# Patient Record
Sex: MALE | Race: WHITE | NOT HISPANIC OR LATINO | Employment: OTHER | URBAN - METROPOLITAN AREA
[De-identification: names, ages, dates, MRNs, and addresses within clinical notes are randomized per-mention and may not be internally consistent; named-entity substitution may affect disease eponyms.]

---

## 2024-01-04 ENCOUNTER — HOSPITAL ENCOUNTER (EMERGENCY)
Facility: HOSPITAL | Age: 59
Discharge: HOME | End: 2024-01-04
Payer: COMMERCIAL

## 2024-01-04 VITALS
BODY MASS INDEX: 34.3 KG/M2 | HEART RATE: 71 BPM | OXYGEN SATURATION: 98 % | TEMPERATURE: 98 F | RESPIRATION RATE: 19 BRPM | WEIGHT: 245 LBS | DIASTOLIC BLOOD PRESSURE: 103 MMHG | SYSTOLIC BLOOD PRESSURE: 129 MMHG | HEIGHT: 71 IN

## 2024-01-04 DIAGNOSIS — R07.9 CHEST PAIN: ICD-10-CM

## 2024-01-04 LAB
ALBUMIN SERPL BCP-MCNC: 4.6 G/DL (ref 3.5–5.2)
ALP SERPL-CCNC: 74 U/L (ref 55–135)
ALT SERPL W/O P-5'-P-CCNC: 61 U/L (ref 10–44)
ANION GAP SERPL CALC-SCNC: 8 MMOL/L (ref 8–16)
AST SERPL-CCNC: 37 U/L (ref 10–40)
BASOPHILS # BLD AUTO: 0.04 K/UL (ref 0–0.2)
BASOPHILS NFR BLD: 0.7 % (ref 0–1.9)
BILIRUB SERPL-MCNC: 1.2 MG/DL (ref 0.1–1)
BNP SERPL-MCNC: 54 PG/ML (ref 0–99)
BUN SERPL-MCNC: 15 MG/DL (ref 6–20)
CALCIUM SERPL-MCNC: 9.3 MG/DL (ref 8.7–10.5)
CHLORIDE SERPL-SCNC: 106 MMOL/L (ref 95–110)
CO2 SERPL-SCNC: 25 MMOL/L (ref 23–29)
CREAT SERPL-MCNC: 1 MG/DL (ref 0.5–1.4)
DIFFERENTIAL METHOD BLD: ABNORMAL
EOSINOPHIL # BLD AUTO: 0.1 K/UL (ref 0–0.5)
EOSINOPHIL NFR BLD: 2.2 % (ref 0–8)
ERYTHROCYTE [DISTWIDTH] IN BLOOD BY AUTOMATED COUNT: 12.1 % (ref 11.5–14.5)
EST. GFR  (NO RACE VARIABLE): >60 ML/MIN/1.73 M^2
GLUCOSE SERPL-MCNC: 95 MG/DL (ref 70–110)
HCT VFR BLD AUTO: 43.3 % (ref 40–54)
HGB BLD-MCNC: 14.8 G/DL (ref 14–18)
IMM GRANULOCYTES # BLD AUTO: 0.01 K/UL (ref 0–0.04)
IMM GRANULOCYTES NFR BLD AUTO: 0.2 % (ref 0–0.5)
LYMPHOCYTES # BLD AUTO: 2.2 K/UL (ref 1–4.8)
LYMPHOCYTES NFR BLD: 36.5 % (ref 18–48)
MAGNESIUM SERPL-MCNC: 2.2 MG/DL (ref 1.6–2.6)
MCH RBC QN AUTO: 31.1 PG (ref 27–31)
MCHC RBC AUTO-ENTMCNC: 34.2 G/DL (ref 32–36)
MCV RBC AUTO: 91 FL (ref 82–98)
MONOCYTES # BLD AUTO: 0.6 K/UL (ref 0.3–1)
MONOCYTES NFR BLD: 10.3 % (ref 4–15)
NEUTROPHILS # BLD AUTO: 3 K/UL (ref 1.8–7.7)
NEUTROPHILS NFR BLD: 50.1 % (ref 38–73)
NRBC BLD-RTO: 0 /100 WBC
PLATELET # BLD AUTO: 219 K/UL (ref 150–450)
PMV BLD AUTO: 10 FL (ref 9.2–12.9)
POTASSIUM SERPL-SCNC: 4.4 MMOL/L (ref 3.5–5.1)
PROT SERPL-MCNC: 7.8 G/DL (ref 6–8.4)
RBC # BLD AUTO: 4.76 M/UL (ref 4.6–6.2)
SODIUM SERPL-SCNC: 139 MMOL/L (ref 136–145)
TROPONIN I SERPL HS-MCNC: 3.9 PG/ML (ref 0–14.9)
WBC # BLD AUTO: 6.02 K/UL (ref 3.9–12.7)

## 2024-01-04 PROCEDURE — 83880 ASSAY OF NATRIURETIC PEPTIDE: CPT | Performed by: NURSE PRACTITIONER

## 2024-01-04 PROCEDURE — 93005 ELECTROCARDIOGRAM TRACING: CPT | Performed by: INTERNAL MEDICINE

## 2024-01-04 PROCEDURE — 83735 ASSAY OF MAGNESIUM: CPT | Performed by: NURSE PRACTITIONER

## 2024-01-04 PROCEDURE — 85025 COMPLETE CBC W/AUTO DIFF WBC: CPT | Performed by: NURSE PRACTITIONER

## 2024-01-04 PROCEDURE — 80053 COMPREHEN METABOLIC PANEL: CPT | Performed by: NURSE PRACTITIONER

## 2024-01-04 PROCEDURE — 99281 EMR DPT VST MAYX REQ PHY/QHP: CPT | Mod: 25

## 2024-01-04 PROCEDURE — 84484 ASSAY OF TROPONIN QUANT: CPT | Performed by: NURSE PRACTITIONER

## 2024-01-04 PROCEDURE — 93010 ELECTROCARDIOGRAM REPORT: CPT | Mod: ,,, | Performed by: INTERNAL MEDICINE

## 2024-01-04 NOTE — FIRST PROVIDER EVALUATION
"Medical screening examination initiated.  I have conducted a focused provider triage encounter, findings are as follows:    Brief history of present illness:  ***    Vitals:    01/04/24 1116 01/04/24 1117   BP: (!) 129/103    BP Location: Left arm    Patient Position: Sitting    Pulse: 71    Resp: 19    Temp: 97.7 °F (36.5 °C)    TempSrc: Oral    SpO2: 98%    Weight:  111.1 kg (245 lb)   Height:  5' 11" (1.803 m)       Pertinent physical exam:  ***    Brief workup plan:  ***    Preliminary workup initiated; this workup will be continued and followed by the physician or advanced practice provider that is assigned to the patient when roomed.  "

## 2024-01-04 NOTE — FIRST PROVIDER EVALUATION
"Medical screening examination initiated.  I have conducted a focused provider triage encounter, findings are as follows:    Brief history of present illness:  Patient reports being short of breath this morning.  He reports a tightness in his chest.  His cardiologist called him to confirm an appointment for Monday.  When he told the nurse what was going on she send him immediately here to the ED.    Vitals:    01/04/24 1116 01/04/24 1117   BP: (!) 129/103    BP Location: Left arm    Patient Position: Sitting    Pulse: 71    Resp: 19    Temp: 97.7 °F (36.5 °C)    TempSrc: Oral    SpO2: 98%    Weight:  111.1 kg (245 lb)   Height:  5' 11" (1.803 m)       Pertinent physical exam:  Heart rate is regular lungs are clear to auscultation.  Patient is awake alert and oriented    Brief workup plan:  Cardiac workup.    Preliminary workup initiated; this workup will be continued and followed by the physician or advanced practice provider that is assigned to the patient when roomed.  "

## 2024-04-08 ENCOUNTER — TELEPHONE (OUTPATIENT)
Dept: CARDIOLOGY | Facility: CLINIC | Age: 59
End: 2024-04-08
Payer: COMMERCIAL

## 2024-04-08 NOTE — TELEPHONE ENCOUNTER
04/08/24  Called and spoke with patient. His insurance is out of network and he needs to contact his insurance company to see another cardiologist in his plan.  Megan

## 2025-07-31 DIAGNOSIS — I25.83 CORONARY ARTERY DISEASE DUE TO LIPID RICH PLAQUE: Primary | ICD-10-CM

## 2025-07-31 DIAGNOSIS — I25.10 CORONARY ARTERY DISEASE DUE TO LIPID RICH PLAQUE: Primary | ICD-10-CM

## 2025-08-29 ENCOUNTER — DOCUMENTATION ONLY (OUTPATIENT)
Dept: CARDIOLOGY | Facility: CLINIC | Age: 60
End: 2025-08-29
Payer: COMMERCIAL

## 2025-08-29 ENCOUNTER — OFFICE VISIT (OUTPATIENT)
Dept: CARDIOLOGY | Facility: CLINIC | Age: 60
End: 2025-08-29
Payer: COMMERCIAL

## 2025-08-29 ENCOUNTER — PATIENT MESSAGE (OUTPATIENT)
Dept: CARDIOLOGY | Facility: CLINIC | Age: 60
End: 2025-08-29

## 2025-08-29 VITALS
SYSTOLIC BLOOD PRESSURE: 136 MMHG | DIASTOLIC BLOOD PRESSURE: 76 MMHG | BODY MASS INDEX: 35 KG/M2 | HEIGHT: 71 IN | HEART RATE: 65 BPM | OXYGEN SATURATION: 97 % | WEIGHT: 250 LBS

## 2025-08-29 DIAGNOSIS — T82.855A CORONARY STENT RESTENOSIS DUE TO SCAR TISSUE, INITIAL ENCOUNTER: Primary | ICD-10-CM

## 2025-08-29 DIAGNOSIS — I20.89 STABLE ANGINA PECTORIS: ICD-10-CM

## 2025-08-29 PROCEDURE — 99999 PR PBB SHADOW E&M-EST. PATIENT-LVL IV: CPT | Mod: PBBFAC,,, | Performed by: INTERNAL MEDICINE

## 2025-08-29 RX ORDER — TAMSULOSIN HYDROCHLORIDE 0.4 MG/1
CAPSULE ORAL DAILY
COMMUNITY

## 2025-08-29 RX ORDER — RANOLAZINE 500 MG/1
500 TABLET, EXTENDED RELEASE ORAL 2 TIMES DAILY
COMMUNITY

## 2025-08-29 RX ORDER — PANTOPRAZOLE SODIUM 40 MG/1
40 TABLET, DELAYED RELEASE ORAL DAILY
COMMUNITY

## 2025-08-29 RX ORDER — LISINOPRIL 10 MG/1
10 TABLET ORAL DAILY
COMMUNITY

## 2025-08-29 RX ORDER — CLOPIDOGREL BISULFATE 75 MG/1
75 TABLET ORAL DAILY
COMMUNITY

## 2025-08-29 RX ORDER — PRAVASTATIN SODIUM 40 MG/1
40 TABLET ORAL DAILY
COMMUNITY

## 2025-09-04 ENCOUNTER — TELEPHONE (OUTPATIENT)
Dept: HEMATOLOGY/ONCOLOGY | Facility: CLINIC | Age: 60
End: 2025-09-04
Payer: COMMERCIAL